# Patient Record
Sex: MALE | Race: BLACK OR AFRICAN AMERICAN | NOT HISPANIC OR LATINO | Employment: UNEMPLOYED | URBAN - METROPOLITAN AREA
[De-identification: names, ages, dates, MRNs, and addresses within clinical notes are randomized per-mention and may not be internally consistent; named-entity substitution may affect disease eponyms.]

---

## 2023-11-19 ENCOUNTER — HOSPITAL ENCOUNTER (EMERGENCY)
Facility: HOSPITAL | Age: 53
Discharge: HOME | End: 2023-11-19
Attending: STUDENT IN AN ORGANIZED HEALTH CARE EDUCATION/TRAINING PROGRAM
Payer: MEDICAID

## 2023-11-19 VITALS
BODY MASS INDEX: 32.73 KG/M2 | SYSTOLIC BLOOD PRESSURE: 128 MMHG | RESPIRATION RATE: 15 BRPM | HEART RATE: 88 BPM | WEIGHT: 255 LBS | HEIGHT: 74 IN | OXYGEN SATURATION: 96 % | TEMPERATURE: 98 F | DIASTOLIC BLOOD PRESSURE: 73 MMHG

## 2023-11-19 DIAGNOSIS — E16.2 HYPOGLYCEMIA: Primary | ICD-10-CM

## 2023-11-19 DIAGNOSIS — R89.9 ABNORMAL LABORATORY TEST: ICD-10-CM

## 2023-11-19 LAB
ALBUMIN SERPL BCP-MCNC: 3.6 G/DL (ref 3.5–5.2)
ALP SERPL-CCNC: 46 U/L (ref 55–135)
ALT SERPL W/O P-5'-P-CCNC: 18 U/L (ref 10–44)
ANION GAP SERPL CALC-SCNC: 8 MMOL/L (ref 8–16)
AST SERPL-CCNC: 17 U/L (ref 10–40)
BASOPHILS # BLD AUTO: 0.03 K/UL (ref 0–0.2)
BASOPHILS NFR BLD: 0.5 % (ref 0–1.9)
BILIRUB SERPL-MCNC: 0.3 MG/DL (ref 0.1–1)
BUN SERPL-MCNC: 16 MG/DL (ref 6–20)
CALCIUM SERPL-MCNC: 8.7 MG/DL (ref 8.7–10.5)
CHLORIDE SERPL-SCNC: 99 MMOL/L (ref 95–110)
CO2 SERPL-SCNC: 29 MMOL/L (ref 23–29)
CREAT SERPL-MCNC: 1.2 MG/DL (ref 0.5–1.4)
DIFFERENTIAL METHOD: ABNORMAL
EOSINOPHIL # BLD AUTO: 0.2 K/UL (ref 0–0.5)
EOSINOPHIL NFR BLD: 3.3 % (ref 0–8)
ERYTHROCYTE [DISTWIDTH] IN BLOOD BY AUTOMATED COUNT: 13.4 % (ref 11.5–14.5)
EST. GFR  (NO RACE VARIABLE): >60 ML/MIN/1.73 M^2
GLUCOSE SERPL-MCNC: 323 MG/DL (ref 70–110)
GLUCOSE SERPL-MCNC: 328 MG/DL (ref 70–110)
HCT VFR BLD AUTO: 39.9 % (ref 40–54)
HGB BLD-MCNC: 13.2 G/DL (ref 14–18)
IMM GRANULOCYTES # BLD AUTO: 0.01 K/UL (ref 0–0.04)
IMM GRANULOCYTES NFR BLD AUTO: 0.2 % (ref 0–0.5)
LYMPHOCYTES # BLD AUTO: 2.5 K/UL (ref 1–4.8)
LYMPHOCYTES NFR BLD: 40.7 % (ref 18–48)
MCH RBC QN AUTO: 27 PG (ref 27–31)
MCHC RBC AUTO-ENTMCNC: 33.1 G/DL (ref 32–36)
MCV RBC AUTO: 82 FL (ref 82–98)
MONOCYTES # BLD AUTO: 0.7 K/UL (ref 0.3–1)
MONOCYTES NFR BLD: 11.3 % (ref 4–15)
NEUTROPHILS # BLD AUTO: 2.7 K/UL (ref 1.8–7.7)
NEUTROPHILS NFR BLD: 44 % (ref 38–73)
NRBC BLD-RTO: 0 /100 WBC
PLATELET # BLD AUTO: 267 K/UL (ref 150–450)
PMV BLD AUTO: 10.1 FL (ref 9.2–12.9)
POTASSIUM SERPL-SCNC: 3.3 MMOL/L (ref 3.5–5.1)
PROT SERPL-MCNC: 6.3 G/DL (ref 6–8.4)
RBC # BLD AUTO: 4.89 M/UL (ref 4.6–6.2)
SODIUM SERPL-SCNC: 136 MMOL/L (ref 136–145)
WBC # BLD AUTO: 6.04 K/UL (ref 3.9–12.7)

## 2023-11-19 PROCEDURE — 82962 GLUCOSE BLOOD TEST: CPT

## 2023-11-19 PROCEDURE — 80053 COMPREHEN METABOLIC PANEL: CPT | Performed by: STUDENT IN AN ORGANIZED HEALTH CARE EDUCATION/TRAINING PROGRAM

## 2023-11-19 PROCEDURE — 85025 COMPLETE CBC W/AUTO DIFF WBC: CPT | Performed by: STUDENT IN AN ORGANIZED HEALTH CARE EDUCATION/TRAINING PROGRAM

## 2023-11-19 PROCEDURE — 99283 EMERGENCY DEPT VISIT LOW MDM: CPT
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
## 2024-01-19 ENCOUNTER — CLINICAL SUPPORT (OUTPATIENT)
Dept: URGENT CARE | Facility: CLINIC | Age: 54
End: 2024-01-19
Payer: MEDICAID

## 2024-01-19 DIAGNOSIS — Z00.00 ROUTINE GENERAL MEDICAL EXAMINATION AT A HEALTH CARE FACILITY: Primary | ICD-10-CM

## 2024-01-19 PROCEDURE — 99499 UNLISTED E&M SERVICE: CPT | Mod: S$GLB,,, | Performed by: NURSE PRACTITIONER

## 2024-03-14 ENCOUNTER — HOSPITAL ENCOUNTER (EMERGENCY)
Facility: HOSPITAL | Age: 54
Discharge: LEFT BEFORE DISCHARGE | End: 2024-03-14
Payer: MEDICAID

## 2024-03-14 VITALS
TEMPERATURE: 99 F | HEIGHT: 74 IN | OXYGEN SATURATION: 95 % | DIASTOLIC BLOOD PRESSURE: 109 MMHG | RESPIRATION RATE: 16 BRPM | WEIGHT: 255 LBS | BODY MASS INDEX: 32.73 KG/M2 | SYSTOLIC BLOOD PRESSURE: 192 MMHG | HEART RATE: 85 BPM

## 2024-03-14 DIAGNOSIS — M79.602 LEFT ARM PAIN: ICD-10-CM

## 2024-03-14 PROCEDURE — 93005 ELECTROCARDIOGRAM TRACING: CPT | Performed by: GENERAL PRACTICE

## 2024-03-14 PROCEDURE — 99283 EMERGENCY DEPT VISIT LOW MDM: CPT | Mod: 25

## 2024-03-14 PROCEDURE — 93010 ELECTROCARDIOGRAM REPORT: CPT | Mod: ,,, | Performed by: GENERAL PRACTICE

## 2024-03-14 NOTE — FIRST PROVIDER EVALUATION
" Emergency Department TeleTriage Encounter Note      CHIEF COMPLAINT    Chief Complaint   Patient presents with    Neck Pain    Shoulder Pain     Left, c/o numbness tingling x 1 week       VITAL SIGNS   Initial Vitals [03/14/24 1529]   BP Pulse Resp Temp SpO2   (!) 192/109 85 16 98.9 °F (37.2 °C) 95 %      MAP       --            ALLERGIES    Review of patient's allergies indicates:  No Known Allergies    PROVIDER TRIAGE NOTE  Patient presents with pain in the left side of the neck and radiating down leg arm. Also reports left jaw "feels funny". No chest pain or SOB.       ORDERS  Labs Reviewed - No data to display    ED Orders (720h ago, onward)      None              Virtual Visit Note: The provider triage portion of this emergency department evaluation and documentation was performed via [employer], a HIPAA-compliant telemedicine application, in concert with a tele-presenter in the room. A face to face patient evaluation with one of my colleagues will occur once the patient is placed in an emergency department room.      DISCLAIMER: This note was prepared with [employer] voice recognition transcription software. Garbled syntax, mangled pronouns, and other bizarre constructions may be attributed to that software system.    "

## 2024-03-31 LAB
OHS QRS DURATION: 94 MS
OHS QTC CALCULATION: 457 MS

## 2024-07-29 ENCOUNTER — OFFICE VISIT (OUTPATIENT)
Dept: URGENT CARE | Facility: CLINIC | Age: 54
End: 2024-07-29
Payer: MEDICAID

## 2024-07-29 VITALS
OXYGEN SATURATION: 98 % | SYSTOLIC BLOOD PRESSURE: 161 MMHG | BODY MASS INDEX: 32.73 KG/M2 | HEIGHT: 74 IN | RESPIRATION RATE: 16 BRPM | WEIGHT: 255 LBS | HEART RATE: 96 BPM | DIASTOLIC BLOOD PRESSURE: 81 MMHG | TEMPERATURE: 99 F

## 2024-07-29 DIAGNOSIS — M70.21 OLECRANON BURSITIS OF RIGHT ELBOW: Primary | ICD-10-CM

## 2024-07-29 PROCEDURE — 99214 OFFICE O/P EST MOD 30 MIN: CPT | Mod: S$GLB,,, | Performed by: PHYSICIAN ASSISTANT

## 2024-07-29 RX ORDER — CARVEDILOL 12.5 MG/1
1 TABLET ORAL 2 TIMES DAILY WITH MEALS
COMMUNITY
Start: 2024-07-25

## 2024-07-29 RX ORDER — BLOOD-GLUCOSE SENSOR
1 EACH MISCELLANEOUS
COMMUNITY
Start: 2023-12-04

## 2024-07-29 RX ORDER — INSULIN GLARGINE 100 [IU]/ML
50 INJECTION, SOLUTION SUBCUTANEOUS
COMMUNITY
Start: 2024-07-02

## 2024-07-29 RX ORDER — PEN NEEDLE, DIABETIC 32GX 5/32"
NEEDLE, DISPOSABLE MISCELLANEOUS
COMMUNITY

## 2024-07-29 RX ORDER — ROSUVASTATIN CALCIUM 10 MG/1
1 TABLET, COATED ORAL DAILY
COMMUNITY
Start: 2024-07-25

## 2024-07-29 RX ORDER — SEMAGLUTIDE 2.68 MG/ML
INJECTION, SOLUTION SUBCUTANEOUS
COMMUNITY

## 2024-07-29 RX ORDER — NAPROXEN 500 MG/1
500 TABLET ORAL 2 TIMES DAILY WITH MEALS
Qty: 14 TABLET | Refills: 0 | Status: SHIPPED | OUTPATIENT
Start: 2024-07-29 | End: 2024-08-05

## 2024-07-29 NOTE — PROGRESS NOTES
"Subjective:      Patient ID: Anthony Pedersen is a 54 y.o. male.    Vitals:  height is 6' 2" (1.88 m) and weight is 115.7 kg (255 lb). His temperature is 98.9 °F (37.2 °C). His blood pressure is 161/81 (abnormal) and his pulse is 96. His respiration is 16 and oxygen saturation is 98%.     Chief Complaint: Elbow Pain    Patient is a 54 year old male who presents with right elbow pain for five days. She has PMH significant for hypertension and DM. He reports associated cyst/swelling to his elbow.  He denies any redness or warmth.  He denies injuries.  He is right-hand dominant.  He drives trucks for living.        Constitution: Negative for chills and fever.   Cardiovascular:  Negative for chest pain.   Respiratory:  Negative for cough.    Gastrointestinal:  Negative for abdominal pain and vomiting.   Musculoskeletal:  Positive for joint pain and joint swelling. Negative for pain and trauma.   Skin:  Negative for erythema.   Neurological:  Negative for headaches.      Objective:     Physical Exam   Constitutional: He is oriented to person, place, and time. He appears well-developed.   HENT:   Head: Normocephalic and atraumatic. Head is without abrasion, without contusion and without laceration.   Ears:   Right Ear: External ear normal.   Left Ear: External ear normal.   Nose: Nose normal.   Mouth/Throat: Oropharynx is clear and moist and mucous membranes are normal.   Eyes: Conjunctivae, EOM and lids are normal.   Neck: Trachea normal and phonation normal. Neck supple.   Cardiovascular: Normal rate, regular rhythm and normal heart sounds.   Pulmonary/Chest: Effort normal and breath sounds normal. No stridor. No respiratory distress.   Musculoskeletal: Normal range of motion.         General: Normal range of motion.      Comments: Swelling to the right olecranon bursa with no erythema. Full, passive ROM to the right elbow. 2+ radial pulse. Normal strength and sensation.   Neurological: He is alert and oriented to " person, place, and time.   Skin: Skin is warm, dry, intact and no rash. Capillary refill takes less than 2 seconds. No abrasion, No burn, No bruising, No erythema and No ecchymosis   Psychiatric: His speech is normal and behavior is normal. Judgment and thought content normal.   Nursing note and vitals reviewed.      Assessment:     1. Olecranon bursitis of right elbow        Plan:       Olecranon bursitis of right elbow  -     naproxen (NAPROSYN) 500 MG tablet; Take 1 tablet (500 mg total) by mouth 2 (two) times daily with meals. for 7 days  Dispense: 14 tablet; Refill: 0  -     Ambulatory referral/consult to Orthopedics          Medical Decision Making:   History:   Old Medical Records: I decided to obtain old medical records.  Urgent Care Management:  Urgent evaluation of a 54-year-old male who presents with swelling to the right elbow.  Denies trauma.  He has full, passive range of motion.  No erythema or warmth concerning for infectious process.  He has swelling to the right olecranon bursa.  Compression bandage placed.  Recommend follow up Orthopedics if symptoms persist.

## 2024-08-11 ENCOUNTER — HOSPITAL ENCOUNTER (EMERGENCY)
Facility: HOSPITAL | Age: 54
Discharge: HOME | End: 2024-08-11
Attending: EMERGENCY MEDICINE
Payer: MEDICAID

## 2024-08-11 VITALS
DIASTOLIC BLOOD PRESSURE: 86 MMHG | SYSTOLIC BLOOD PRESSURE: 156 MMHG | TEMPERATURE: 99 F | OXYGEN SATURATION: 95 % | RESPIRATION RATE: 18 BRPM | WEIGHT: 255 LBS | BODY MASS INDEX: 32.73 KG/M2 | HEIGHT: 74 IN | HEART RATE: 90 BPM

## 2024-08-11 DIAGNOSIS — M77.8 ENTHESOPATHY OF RIGHT ELBOW: ICD-10-CM

## 2024-08-11 DIAGNOSIS — M70.21 OLECRANON BURSITIS OF RIGHT ELBOW: Primary | ICD-10-CM

## 2024-08-11 DIAGNOSIS — M25.529 ELBOW PAIN: ICD-10-CM

## 2024-08-11 PROCEDURE — 25000003 PHARM REV CODE 250: Performed by: EMERGENCY MEDICINE

## 2024-08-11 PROCEDURE — 99284 EMERGENCY DEPT VISIT MOD MDM: CPT | Mod: 25

## 2024-08-11 RX ORDER — OXYCODONE AND ACETAMINOPHEN 5; 325 MG/1; MG/1
1 TABLET ORAL
Status: COMPLETED | OUTPATIENT
Start: 2024-08-11 | End: 2024-08-11

## 2024-08-11 RX ORDER — NAPROXEN 500 MG/1
500 TABLET ORAL 2 TIMES DAILY PRN
Qty: 20 TABLET | Refills: 0 | Status: SHIPPED | OUTPATIENT
Start: 2024-08-11

## 2024-08-11 RX ORDER — OXYCODONE AND ACETAMINOPHEN 5; 325 MG/1; MG/1
1 TABLET ORAL EVERY 6 HOURS PRN
Qty: 12 TABLET | Refills: 0 | Status: SHIPPED | OUTPATIENT
Start: 2024-08-11

## 2024-08-11 RX ADMIN — OXYCODONE HYDROCHLORIDE AND ACETAMINOPHEN 1 TABLET: 5; 325 TABLET ORAL at 03:08
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
## 2024-08-11 NOTE — DISCHARGE INSTRUCTIONS
You have been prescribed a short course of narcotic based pain medication.  Common side effects are drowsiness and constipation.  You may take over-the-counter stool softener such as Senokot as needed, as directed.    Please follow-up with your primary care provider within the next 1 week for recheck.  Also recommend orthopedic referral.

## 2025-02-07 ENCOUNTER — OFFICE VISIT (OUTPATIENT)
Dept: URGENT CARE | Facility: CLINIC | Age: 55
End: 2025-02-07
Payer: MEDICAID

## 2025-02-07 VITALS
OXYGEN SATURATION: 97 % | SYSTOLIC BLOOD PRESSURE: 117 MMHG | RESPIRATION RATE: 12 BRPM | HEIGHT: 74 IN | DIASTOLIC BLOOD PRESSURE: 73 MMHG | BODY MASS INDEX: 32.73 KG/M2 | HEART RATE: 84 BPM | TEMPERATURE: 99 F | WEIGHT: 255 LBS

## 2025-02-07 DIAGNOSIS — M54.16 LUMBAR BACK PAIN WITH RADICULOPATHY AFFECTING LOWER EXTREMITY: Primary | ICD-10-CM

## 2025-02-07 DIAGNOSIS — M54.31 BILATERAL SCIATICA: ICD-10-CM

## 2025-02-07 DIAGNOSIS — M54.32 BILATERAL SCIATICA: ICD-10-CM

## 2025-02-07 PROBLEM — I10 BENIGN ESSENTIAL HTN: Status: ACTIVE | Noted: 2021-01-19

## 2025-02-07 PROCEDURE — 99214 OFFICE O/P EST MOD 30 MIN: CPT | Mod: S$GLB,,, | Performed by: NURSE PRACTITIONER

## 2025-02-07 RX ORDER — KETOROLAC TROMETHAMINE 30 MG/ML
30 INJECTION, SOLUTION INTRAMUSCULAR; INTRAVENOUS
Status: COMPLETED | OUTPATIENT
Start: 2025-02-07 | End: 2025-02-07

## 2025-02-07 RX ORDER — AMLODIPINE AND VALSARTAN 10; 320 MG/1; MG/1
TABLET ORAL
COMMUNITY

## 2025-02-07 RX ORDER — DICLOFENAC SODIUM 75 MG/1
75 TABLET, DELAYED RELEASE ORAL 2 TIMES DAILY
Qty: 28 TABLET | Refills: 0 | Status: SHIPPED | OUTPATIENT
Start: 2025-02-07 | End: 2025-02-21

## 2025-02-07 RX ORDER — CLONIDINE HYDROCHLORIDE 0.1 MG/1
1 TABLET ORAL 2 TIMES DAILY
COMMUNITY

## 2025-02-07 RX ORDER — DAPAGLIFLOZIN 5 MG/1
5 TABLET, FILM COATED ORAL
COMMUNITY

## 2025-02-07 RX ORDER — HYDROCODONE BITARTRATE AND ACETAMINOPHEN 10; 325 MG/1; MG/1
1 TABLET ORAL EVERY 6 HOURS PRN
Qty: 20 TABLET | Refills: 0 | Status: SHIPPED | OUTPATIENT
Start: 2025-02-07

## 2025-02-07 RX ORDER — SILDENAFIL 100 MG/1
100 TABLET, FILM COATED ORAL DAILY PRN
COMMUNITY
Start: 2025-01-14

## 2025-02-07 RX ADMIN — KETOROLAC TROMETHAMINE 30 MG: 30 INJECTION, SOLUTION INTRAMUSCULAR; INTRAVENOUS at 12:02

## 2025-02-07 NOTE — PROGRESS NOTES
"Subjective:      Patient ID: Anthony Pedersen is a 54 y.o. male.    Vitals:  height is 6' 2" (1.88 m) and weight is 115.7 kg (255 lb). His oral temperature is 98.7 °F (37.1 °C). His blood pressure is 117/73 and his pulse is 84. His respiration is 12 and oxygen saturation is 97%.     Chief Complaint: Back Pain    Pt states he was getting insulation rolls in and out of a truck and hurt his back. About 1 hour ago.    Long hx of back problems and surgeries.  Has not had recent MRI or currently established with back and spine.  Is established at [facility] for other ortho issues.     Back Pain  Pertinent negatives include no bladder incontinence, bowel incontinence, fever or numbness.     Constitution: Negative for chills and fever.   Gastrointestinal:  Negative for bowel incontinence.   Genitourinary:  Negative for flank pain, bladder incontinence and hematuria.   Musculoskeletal:  Positive for pain (Pain radiating to both hips and down back of both thighs), trauma (Sudden onset of pain after moving heavy objects), back pain and pain with walking.   Skin:  Negative for rash, lesion, erythema and bruising.   Neurological:  Negative for numbness and tingling.        Denies saddle anesthesia      Objective:     Physical Exam   Constitutional: He is oriented to person, place, and time.  Non-toxic appearance. He does not appear ill. No distress.   HENT:   Head: Normocephalic and atraumatic.   Nose: Nose normal.   Mouth/Throat: Mucous membranes are moist.   Eyes: Conjunctivae are normal.   Cardiovascular: Normal rate and regular rhythm.   Pulmonary/Chest: Effort normal. No respiratory distress.   Abdominal: Normal appearance.   Musculoskeletal:        Back:    Neurological: no focal deficit. He is alert and oriented to person, place, and time.   Skin: Skin is warm, dry, not diaphoretic and no rash. Capillary refill takes 2 to 3 seconds. No bruising, No erythema and No lesion   Psychiatric: His behavior is normal. Mood normal. "   Nursing note and vitals reviewed.      Assessment:     1. Lumbar back pain with radiculopathy affecting lower extremity    2. Bilateral sciatica      Last GFR 83  Last A1c 8.3 mg/dl .  Will not consider steroids   Plan:       Lumbar back pain with radiculopathy affecting lower extremity  -     ketorolac injection 30 mg  -     diclofenac (VOLTAREN) 75 MG EC tablet; Take 1 tablet (75 mg total) by mouth 2 (two) times daily. for 14 days  Dispense: 28 tablet; Refill: 0  -     HYDROcodone-acetaminophen (NORCO)  mg per tablet; Take 1 tablet by mouth every 6 (six) hours as needed for Pain.  Dispense: 20 tablet; Refill: 0  -     Ambulatory referral/consult to Orthopedics    Bilateral sciatica  -     ketorolac injection 30 mg  -     diclofenac (VOLTAREN) 75 MG EC tablet; Take 1 tablet (75 mg total) by mouth 2 (two) times daily. for 14 days  Dispense: 28 tablet; Refill: 0  -     HYDROcodone-acetaminophen (NORCO)  mg per tablet; Take 1 tablet by mouth every 6 (six) hours as needed for Pain.  Dispense: 20 tablet; Refill: 0  -     Ambulatory referral/consult to Orthopedics
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
